# Patient Record
(demographics unavailable — no encounter records)

---

## 2020-03-30 NOTE — REP
PET/CT:

 

History: Diagnosing lung cancer.

 

Comparison CT study January 20, 2020. This showed a nodule in the left lower

lobe.

 

TECHNIQUE:

 

1 hour 11 minutes following the intravenous injection of a 9.07 mCi dose of F-18

FDG, three-dimensional PET scintigraphy is acquired from the skull base to the

proximal thighs. Triplanar noncontrast CT scanning is acquired through the same

anatomic range for attenuation correction, and image registration with scan

parameters optimized to minimize radiation exposure to the patient. PET

scintigraphy and CT datasets were fused and displayed on a workstation with

multiplanar and projection display capability.

 

PET/CT Findings: The nodular density seen recently in the left lower lobe shows

visible but non-hypermetabolic FDG accumulation.  Its maximum SUV value is 1.69.

There is no hypermetabolic hilar or mediastinal uptake.  No other pulmonary

parenchymal hypermetabolic uptake is seen.  There is no discernible FDG uptake in

the second nodular opacity seen more superiorly in the left lower lobe.

 

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  No other abnormal

hypermetabolic uptake is seen in the chest.  Head and neck soft tissues show no

suspicious hypermetabolic uptake.

 

In the abdomen and pelvis, there is normal distribution of hepatic, splenic,

gastrointestinal, and genitourinary uptake which is physiologic.  No abnormal

hypermetabolic uptake is seen in the abdomen or pelvis.

 

Impression:

 

The nodular density seen in the left lower lobe on recent chest CT study do not

show hypermetabolic uptake.  Interval followup is recommended.

 

 

Electronically Signed by

Moshe Miguel MD 03/30/2020 07:39 P